# Patient Record
Sex: FEMALE | Race: WHITE | NOT HISPANIC OR LATINO | Employment: FULL TIME | URBAN - METROPOLITAN AREA
[De-identification: names, ages, dates, MRNs, and addresses within clinical notes are randomized per-mention and may not be internally consistent; named-entity substitution may affect disease eponyms.]

---

## 2024-01-17 ENCOUNTER — APPOINTMENT (OUTPATIENT)
Dept: PRIMARY CARE | Facility: CLINIC | Age: 34
End: 2024-01-17
Payer: COMMERCIAL

## 2024-02-05 ENCOUNTER — OFFICE VISIT (OUTPATIENT)
Dept: PRIMARY CARE | Facility: CLINIC | Age: 34
End: 2024-02-05
Payer: COMMERCIAL

## 2024-02-05 VITALS
SYSTOLIC BLOOD PRESSURE: 116 MMHG | WEIGHT: 172.8 LBS | BODY MASS INDEX: 30.62 KG/M2 | DIASTOLIC BLOOD PRESSURE: 64 MMHG | HEIGHT: 63 IN | OXYGEN SATURATION: 98 % | HEART RATE: 72 BPM

## 2024-02-05 DIAGNOSIS — R63.5 WEIGHT GAIN: ICD-10-CM

## 2024-02-05 DIAGNOSIS — Z86.39 HISTORY OF ELEVATED GLUCOSE: ICD-10-CM

## 2024-02-05 DIAGNOSIS — R53.83 OTHER FATIGUE: Primary | ICD-10-CM

## 2024-02-05 PROBLEM — B96.89 BACTERIAL VAGINOSIS: Status: RESOLVED | Noted: 2024-02-05 | Resolved: 2024-02-05

## 2024-02-05 PROBLEM — N76.0 BACTERIAL VAGINOSIS: Status: RESOLVED | Noted: 2024-02-05 | Resolved: 2024-02-05

## 2024-02-05 PROCEDURE — 1036F TOBACCO NON-USER: CPT | Performed by: PHYSICIAN ASSISTANT

## 2024-02-05 PROCEDURE — 3008F BODY MASS INDEX DOCD: CPT | Performed by: PHYSICIAN ASSISTANT

## 2024-02-05 PROCEDURE — 99214 OFFICE O/P EST MOD 30 MIN: CPT | Performed by: PHYSICIAN ASSISTANT

## 2024-02-05 RX ORDER — TRAZODONE HYDROCHLORIDE 100 MG/1
100 TABLET ORAL
COMMUNITY

## 2024-02-05 NOTE — PROGRESS NOTES
Subjective   Patient ID: Jennifer Hinton is a 33 y.o. female who presents for Weight Gain.    HPI PT presents to establish care and for discussion of weight loss treatment. PT states when she was in her mid 20's she weighed in the 120's weight range and over the past few years she has had a gradual weight gain. Pt does report attempting diet modifications and exercise but has not been successful. She has noted specifically a trial with Noom without significant benefit.  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 She reports waking up hungary in the middle of the night and has chronic foot pain which she feels is due to her weight gain. PT denies having any additional lab work - she does report having a PCP in [place] but wishes to have a provider here while she is attending school.  PT wishes to start Semiglutide injections.   Pt also states she has had recurrent BV sx's. She denies currently having sx's but states she has been treated several times over the past 2 years for BV - she denies OBC use,  douching, lubrication use, changes in soaps - she does report condom use but denies changing brand or type. She reports currently taking oral probiotics to try to prevent recurrent of symptoms but wishes to be prescribed a preventative cream. She has not seen GYN svcs for her sx's.     Review of Systems   Constitutional:  Positive for fatigue.        Weight gain   HENT: Negative.     Respiratory: Negative.     Cardiovascular: Negative.    Gastrointestinal: Negative.    Endocrine: Negative for cold intolerance, heat intolerance, polydipsia, polyphagia and polyuria.   Genitourinary: Negative.         History of BV infections   Musculoskeletal: Negative.         B/L foot pain   Hematological: Negative.    Psychiatric/Behavioral: Negative.         Objective   /64 (BP Location: Left arm, Patient Position:  "Sitting, BP Cuff Size: Adult)   Pulse 72   Ht 1.6 m (5' 3\")   Wt 78.4 kg (172 lb 12.8 oz)   LMP 01/23/2024 (Exact Date)   SpO2 98%   BMI 30.61 kg/m²     Physical Exam  Vitals and nursing note reviewed.   Constitutional:       General: She is not in acute distress.     Appearance: Normal appearance. She is not ill-appearing.   HENT:      Head: Normocephalic and atraumatic.   Eyes:      Conjunctiva/sclera: Conjunctivae normal.   Skin:     General: Skin is warm and dry.   Neurological:      General: No focal deficit present.      Mental Status: She is alert and oriented to person, place, and time.   Psychiatric:         Mood and Affect: Mood normal.         Behavior: Behavior normal.         Assessment/Plan  PT presents to establish care with concerns of weight gain and history of BV. She states originally sought this appt to be started on Semiglutide injections for weight loss. She reports she received my name as a provider who treats weight loss from [facility] and was hoping we would prescribe semiglutide injections for weight loss. She reports her current insurance will not cover this and she can not afford a consultation with a boutique clinic provider.  I discussed with her at length that our office is not a weight loss clinic - that we are a primary care facility. We discussed obtaining labs to try to understand her sx's of fatigue and increased hunger specifically at night as well as see if there is other causes for her weight gain. We also discussed side effects of semiglutide as well as the need to make dietary and exercise changes for weight loss as well as alternative medication and treatments for weight loss.  Pt stated she would like to have lab orders placed and would be interested in discussion with a weight loss provider.  Will place labs orders as well a referral for discussion with weight loss providers.  We also discussed PT's history of recurrent BV infections. She denies " current symptoms. At this point, I can not provide therapy without testing and I am not aware of preventative treatment other than what she is currently doing in avoidance/ prevention. I have recommended she continue with her current preventative actions and if she has sx's to return for evaluation for testing or possibly establish with GYN svcs to see if  they may provide additional recommendations and or treatments.    PT will follow up once her labs are reviewed  - sooner if she has any complications.     Diagnoses and all orders for this visit:  Other fatigue  -     Thyroid Stimulating Hormone; Future  History of elevated glucose  -     Hemoglobin A1C; Future  -     Lipid Panel; Future  -     Comprehensive Metabolic Panel; Future  BMI 30.0-30.9,adult  -     Referral to Bariatric Surgery; Future  Weight gain

## 2024-02-06 ENCOUNTER — LAB (OUTPATIENT)
Dept: LAB | Facility: LAB | Age: 34
End: 2024-02-06
Payer: COMMERCIAL

## 2024-02-06 DIAGNOSIS — Z86.39 HISTORY OF ELEVATED GLUCOSE: ICD-10-CM

## 2024-02-06 DIAGNOSIS — R53.83 OTHER FATIGUE: ICD-10-CM

## 2024-02-06 LAB
ALBUMIN SERPL BCP-MCNC: 4.4 G/DL (ref 3.4–5)
ALP SERPL-CCNC: 60 U/L (ref 33–110)
ALT SERPL W P-5'-P-CCNC: 16 U/L (ref 7–45)
ANION GAP SERPL CALC-SCNC: 11 MMOL/L (ref 10–20)
AST SERPL W P-5'-P-CCNC: 17 U/L (ref 9–39)
BILIRUB SERPL-MCNC: 0.4 MG/DL (ref 0–1.2)
BUN SERPL-MCNC: 14 MG/DL (ref 6–23)
CALCIUM SERPL-MCNC: 9 MG/DL (ref 8.6–10.3)
CHLORIDE SERPL-SCNC: 102 MMOL/L (ref 98–107)
CHOLEST SERPL-MCNC: 153 MG/DL (ref 0–199)
CHOLESTEROL/HDL RATIO: 2.8
CO2 SERPL-SCNC: 27 MMOL/L (ref 21–32)
CREAT SERPL-MCNC: 0.63 MG/DL (ref 0.5–1.05)
EGFRCR SERPLBLD CKD-EPI 2021: >90 ML/MIN/1.73M*2
EST. AVERAGE GLUCOSE BLD GHB EST-MCNC: 111 MG/DL
GLUCOSE SERPL-MCNC: 88 MG/DL (ref 74–99)
HBA1C MFR BLD: 5.5 %
HDLC SERPL-MCNC: 54.1 MG/DL
LDLC SERPL CALC-MCNC: 83 MG/DL
NON HDL CHOLESTEROL: 99 MG/DL (ref 0–149)
POTASSIUM SERPL-SCNC: 4 MMOL/L (ref 3.5–5.3)
PROT SERPL-MCNC: 7 G/DL (ref 6.4–8.2)
SODIUM SERPL-SCNC: 136 MMOL/L (ref 136–145)
TRIGL SERPL-MCNC: 80 MG/DL (ref 0–149)
TSH SERPL-ACNC: 1.27 MIU/L (ref 0.44–3.98)
VLDL: 16 MG/DL (ref 0–40)

## 2024-02-06 PROCEDURE — 83036 HEMOGLOBIN GLYCOSYLATED A1C: CPT

## 2024-02-06 PROCEDURE — 80053 COMPREHEN METABOLIC PANEL: CPT

## 2024-02-06 PROCEDURE — 36415 COLL VENOUS BLD VENIPUNCTURE: CPT

## 2024-02-06 PROCEDURE — 80061 LIPID PANEL: CPT

## 2024-02-06 PROCEDURE — 84443 ASSAY THYROID STIM HORMONE: CPT

## 2024-02-06 ASSESSMENT — ENCOUNTER SYMPTOMS
RESPIRATORY NEGATIVE: 1
FATIGUE: 1
CARDIOVASCULAR NEGATIVE: 1
PSYCHIATRIC NEGATIVE: 1
POLYDIPSIA: 0
MUSCULOSKELETAL NEGATIVE: 1
GASTROINTESTINAL NEGATIVE: 1
HEMATOLOGIC/LYMPHATIC NEGATIVE: 1
POLYPHAGIA: 0

## 2024-03-08 ENCOUNTER — APPOINTMENT (OUTPATIENT)
Dept: PRIMARY CARE | Facility: CLINIC | Age: 34
End: 2024-03-08
Payer: COMMERCIAL